# Patient Record
Sex: FEMALE | Race: ASIAN | Employment: UNEMPLOYED | URBAN - METROPOLITAN AREA
[De-identification: names, ages, dates, MRNs, and addresses within clinical notes are randomized per-mention and may not be internally consistent; named-entity substitution may affect disease eponyms.]

---

## 2019-05-15 ENCOUNTER — OFFICE VISIT (OUTPATIENT)
Dept: FAMILY MEDICINE | Facility: CLINIC | Age: 44
End: 2019-05-15
Payer: COMMERCIAL

## 2019-05-15 VITALS
TEMPERATURE: 98.9 F | HEIGHT: 61 IN | DIASTOLIC BLOOD PRESSURE: 74 MMHG | OXYGEN SATURATION: 100 % | SYSTOLIC BLOOD PRESSURE: 113 MMHG | BODY MASS INDEX: 24.73 KG/M2 | RESPIRATION RATE: 18 BRPM | HEART RATE: 72 BPM | WEIGHT: 131 LBS

## 2019-05-15 DIAGNOSIS — Z12.4 SCREENING FOR MALIGNANT NEOPLASM OF CERVIX: ICD-10-CM

## 2019-05-15 DIAGNOSIS — Z00.00 ROUTINE HISTORY AND PHYSICAL EXAMINATION OF ADULT: Primary | ICD-10-CM

## 2019-05-15 DIAGNOSIS — Z23 NEED FOR PROPHYLACTIC VACCINATION WITH TETANUS-DIPHTHERIA (TD): ICD-10-CM

## 2019-05-15 DIAGNOSIS — Z12.31 ENCOUNTER FOR SCREENING MAMMOGRAM FOR BREAST CANCER: ICD-10-CM

## 2019-05-15 PROCEDURE — 99386 PREV VISIT NEW AGE 40-64: CPT | Performed by: PREVENTIVE MEDICINE

## 2019-05-15 PROCEDURE — 87624 HPV HI-RISK TYP POOLED RSLT: CPT | Performed by: PREVENTIVE MEDICINE

## 2019-05-15 PROCEDURE — G0145 SCR C/V CYTO,THINLAYER,RESCR: HCPCS | Performed by: PREVENTIVE MEDICINE

## 2019-05-15 ASSESSMENT — MIFFLIN-ST. JEOR: SCORE: 1196.65

## 2019-05-15 ASSESSMENT — PAIN SCALES - GENERAL: PAINLEVEL: NO PAIN (0)

## 2019-05-15 NOTE — PATIENT INSTRUCTIONS
At [facility], we strive to deliver an exceptional experience to you, every time we see you.  If you receive a survey in the mail, please send us back your thoughts. We really do value your feedback.    Based on your medical history, these are the current health maintenance/preventive care services that you are due for (some may have been done at this visit.)  Health Maintenance Due   Topic Date Due     PREVENTIVE CARE VISIT  1976     HIV SCREEN (SYSTEM ASSIGNED)  08/03/1994     PAP SCREENING Q3 YR (SYSTEM ASSIGNED)  08/03/1997     DTAP/TDAP/TD IMMUNIZATION (1 - Tdap) 08/03/2001     PHQ-2  01/01/2019         Suggested websites for health information:  Www.Flexcom.org : Up to date and easily searchable information on multiple topics.  Www.medlineplus.gov : medication info, interactive tutorials, watch real surgeries online  Www.familydoctor.org : good info from the Academy of Family Physicians  Www.cdc.gov : public health info, travel advisories, epidemics (H1N1)  Www.aap.org : children's health info, normal development, vaccinations  Www.health.[facility].[place].us : [place] dept of health, public health issues in [place], N1N1    Your care team:                            Family Medicine Internal Medicine   MD Jamie Castillo MD Shantel Branch-Fleming, MD Katya Georgiev PA-C Nam Ho, MD Pediatrics   SUAD Mcneal, DUNIA Quesada APRMD Jacqui Garcia CNP, MD Deborah Mielke, MD Kim Thein, APRN CNP      Clinic hours: Monday - Thursday 7 am-7 pm; Fridays 7 am-5 pm.   Urgent care: Monday - Friday 11 am-9 pm; Saturday and Sunday 9 am-5 pm.  Pharmacy : Monday -Thursday 8 am-8 pm; Friday 8 am-6 pm; Saturday and Sunday 9 am-5 pm.     Clinic: [phone]   Pharmacy: [phone]    Preventive Health Recommendations  Female Ages 40 to 49    Yearly exam:     See your health care provider every year in order to  1. Review health changes.    2. Discuss preventive care.    3. Review your medicines if your doctor prescribed any.      Get a Pap test every three years (unless you have an abnormal result and your provider advises testing more often).      If you get Pap tests with HPV test, you only need to test every 5 years, unless you have an abnormal result. You do not need a Pap test if your uterus was removed (hysterectomy) and you have not had cancer.      You should be tested each year for STDs (sexually transmitted diseases), if you're at risk.     Ask your doctor if you should have a mammogram.      Have a colonoscopy (test for colon cancer) if someone in your family has had colon cancer or polyps before age 50.       Have a cholesterol test every 5 years.       Have a diabetes test (fasting glucose) after age 45. If you are at risk for diabetes, you should have this test every 3 years.    Shots: Get a flu shot each year. Get a tetanus shot every 10 years.     Nutrition:     Eat at least 5 servings of fruits and vegetables each day.    Eat whole-grain bread, whole-wheat pasta and brown rice instead of white grains and rice.    Get adequate Calcium and Vitamin D.      Lifestyle    Exercise at least 150 minutes a week (an average of 30 minutes a day, 5 days a week). This will help you control your weight and prevent disease.    Limit alcohol to one drink per day.    No smoking.     Wear sunscreen to prevent skin cancer.    See your dentist every six months for an exam and cleaning.  Preventive Health Recommendations  Female Ages 40 to 49    Yearly exam:   See your health care provider every year in order to  Review health changes.   Discuss preventive care.    Review your medicines if your doctor prescribed any.    Get a Pap test every three years (unless you have an abnormal result and your provider advises testing more often).    If you get Pap tests with HPV test, you only need to test every 5 years, unless you have an abnormal result. You do  not need a Pap test if your uterus was removed (hysterectomy) and you have not had cancer.    You should be tested each year for STDs (sexually transmitted diseases), if you're at risk.   Ask your doctor if you should have a mammogram.    Have a colonoscopy (test for colon cancer) if someone in your family has had colon cancer or polyps before age 50.     Have a cholesterol test every 5 years.     Have a diabetes test (fasting glucose) after age 45. If you are at risk for diabetes, you should have this test every 3 years.    Shots: Get a flu shot each year. Get a tetanus shot every 10 years.     Nutrition:   Eat at least 5 servings of fruits and vegetables each day.  Eat whole-grain bread, whole-wheat pasta and brown rice instead of white grains and rice.  Get adequate Calcium and Vitamin D.      Lifestyle  Exercise at least 150 minutes a week (an average of 30 minutes a day, 5 days a week). This will help you control your weight and prevent disease.  Limit alcohol to one drink per day.  No smoking.   Wear sunscreen to prevent skin cancer.  See your dentist every six months for an exam and cleaning.

## 2019-05-15 NOTE — LETTER
May 23, 2019    [facility]  [address] [place] [place] [address]    Dear ,  This letter is regarding your recent Pap smear (cervical cancer screening) and Human Papillomavirus (HPV) test.  We are happy to inform you that your Pap smear result is normal. Cervical cancer is closely linked with certain types of HPV. Your results showed no evidence of high-risk HPV.  Therefore we recommend you return in 5 years for your next pap smear and HPV test.  You will still need to return to the clinic every year for an annual exam and other preventive tests.  If you have additional questions regarding this result, please call our registered nurse, Ellen at [phone].  Sincerely,    Daja Cast MD/po

## 2019-05-15 NOTE — PROGRESS NOTES
SUBJECTIVE:   CC: Danae [facility] is an 43 year old woman who presents for preventive health visit.   Here with Maori interpretor    Healthy Habits:    Do you get at least three servings of calcium containing foods daily (dairy, green leafy vegetables, etc.)? yes    Amount of exercise or daily activities, outside of work: one hour and 4 times per week    Problems taking medications regularly No    Medication side effects: No    Have you had an eye exam in the past two years? yes    Do you see a dentist twice per year? yes    Do you have sleep apnea, excessive snoring or daytime drowsiness?no      Today's PHQ-2 Score:   PHQ-2 ( 1999 Pfizer) 5/15/2019   Q1: Little interest or pleasure in doing things 0   Q2: Feeling down, depressed or hopeless 0   PHQ-2 Score 0       Abuse: Current or Past(Physical, Sexual or Emotional)- No  Do you feel safe in your environment? Yes    Social History     Tobacco Use     Smoking status: Never Smoker     Smokeless tobacco: Never Used   Substance Use Topics     Alcohol use: Not on file     If you drink alcohol do you typically have >3 drinks per day or >7 drinks per week? No                     Reviewed orders with patient.  Reviewed health maintenance and updated orders accordingly - Yes  Lab work is in process  Labs reviewed in EPIC  BP Readings from Last 3 Encounters:   05/15/19 113/74    Wt Readings from Last 3 Encounters:   05/15/19 59.4 kg (131 lb)                  There is no problem list on file for this patient.    History reviewed. No pertinent surgical history.    Social History     Tobacco Use     Smoking status: Never Smoker     Smokeless tobacco: Never Used   Substance Use Topics     Alcohol use: Not on file     History reviewed. No pertinent family history.      No current outpatient medications on file.     No Known Allergies    Mammogram Screening: Patient under age 50, mutual decision reflected in health maintenance.      Pertinent mammograms are reviewed under the  "imaging tab.  History of abnormal Pap smear: NO - age 30-65 PAP every 5 years with negative HPV co-testing recommended     Reviewed and updated as needed this visit by clinical staff  Tobacco  Allergies  Meds  Fam Hx  Soc Hx        Reviewed and updated as needed this visit by Provider        History reviewed. No pertinent past medical history.   History reviewed. No pertinent surgical history.    ROS:  CONSTITUTIONAL: NEGATIVE for fever, chills, change in weight  INTEGUMENTARU/SKIN: NEGATIVE for worrisome rashes, moles or lesions  EYES: NEGATIVE for vision changes or irritation  ENT: NEGATIVE for ear, mouth and throat problems  RESP: NEGATIVE for significant cough or SOB  BREAST: NEGATIVE for masses, tenderness or discharge  CV: NEGATIVE for chest pain, palpitations or peripheral edema  GI: NEGATIVE for nausea, abdominal pain, heartburn, or change in bowel habits  : NEGATIVE for unusual urinary or vaginal symptoms. Periods are regular.  MUSCULOSKELETAL: NEGATIVE for significant arthralgias or myalgia  NEURO: NEGATIVE for weakness, dizziness or paresthesias  ENDOCRINE: NEGATIVE for temperature intolerance, skin/hair changes  HEME/ALLERGY/IMMUNE: NEGATIVE for bleeding problems  PSYCHIATRIC: NEGATIVE for changes in mood or affect    OBJECTIVE:   /74 (BP Location: Left arm, Patient Position: Sitting, Cuff Size: Adult Regular)   Pulse 72   Temp 98.9  F (37.2  C) (Oral)   Resp 18   Ht 1.558 m (5' 1.32\")   Wt 59.4 kg (131 lb)   LMP 05/07/2019 (Approximate)   SpO2 100%   BMI 24.50 kg/m    EXAM:  GENERAL: healthy, alert and no distress  EYES: Eyes grossly normal to inspection, PERRL and conjunctivae and sclerae normal  HENT: ear canals and TM's normal, nose and mouth without ulcers or lesions  NECK: no adenopathy, no asymmetry, masses, or scars and thyroid normal to palpation  RESP: lungs clear to auscultation - no rales, rhonchi or wheezes  BREAST: normal without masses, tenderness or nipple discharge " "and no palpable axillary masses or adenopathy  CV: regular rate and rhythm, normal S1 S2, no S3 or S4, no murmur, click or rub, no peripheral edema and peripheral pulses strong  ABDOMEN: soft, nontender, no hepatosplenomegaly, no masses and bowel sounds normal   (female): normal female external genitalia, normal urethral meatus, vaginal mucosa pink, moist, well rugated, and normal cervix/adnexa/uterus without masses or discharge  MS: no gross musculoskeletal defects noted, no edema  SKIN: no suspicious lesions or rashes  NEURO: Normal strength and tone, mentation intact and speech normal  PSYCH: mentation appears normal, affect normal/bright  LYMPH: no cervical, supraclavicular, axillary adenopathy    Diagnostic Test Results:  No results found for this or any previous visit (from the past 24 hour(s)).    ASSESSMENT/PLAN:   Danae was seen today for physical.    Diagnoses and all orders for this visit:    Routine history and physical examination of adult  -     Lipid panel reflex to direct LDL Fasting; Future  -     Glucose; Future  -will return for future fasting labs     Screening for malignant neoplasm of cervix  -     Pap imaged thin layer screen with HPV - recommended age 30 - 65 years (select HPV order below)  -     HPV High Risk Types DNA Cervical  -screening guidelines reviewed     Need for prophylactic vaccination with tetanus-diphtheria (Td)  -done in [place]     Encounter for screening mammogram for breast cancer  -     *MA Screening Digital Bilateral; Future, appointment scheduled         COUNSELING:   Reviewed preventive health counseling, as reflected in patient instructions       Regular exercise       Healthy diet/nutrition    Estimated body mass index is 24.5 kg/m  as calculated from the following:    Height as of this encounter: 1.558 m (5' 1.32\").    Weight as of this encounter: 59.4 kg (131 lb).         reports that she has never smoked. She has never used smokeless tobacco.      Counseling " Resources:  ATP IV Guidelines  Pooled Cohorts Equation Calculator  Breast Cancer Risk Calculator  FRAX Risk Assessment  ICSI Preventive Guidelines  Dietary Guidelines for Americans, 2010  USDA's MyPlate  ASA Prophylaxis  Lung CA Screening    Daja Cast MD MPH    [facility]

## 2019-05-17 LAB
COPATH REPORT: NORMAL
PAP: NORMAL

## 2019-05-18 DIAGNOSIS — Z00.00 ROUTINE HISTORY AND PHYSICAL EXAMINATION OF ADULT: ICD-10-CM

## 2019-05-18 PROCEDURE — 82947 ASSAY GLUCOSE BLOOD QUANT: CPT | Performed by: PREVENTIVE MEDICINE

## 2019-05-18 PROCEDURE — 36415 COLL VENOUS BLD VENIPUNCTURE: CPT | Performed by: PREVENTIVE MEDICINE

## 2019-05-18 PROCEDURE — 80061 LIPID PANEL: CPT | Performed by: PREVENTIVE MEDICINE

## 2019-05-18 NOTE — LETTER
May 21, 2019      Danae [facility]  [address] [place] [address]      Dear Danae [facility],     Cholesterol is at goal for you.   Glucose is normal, you do not have diabetes.   Please let me know if you have any questions and thank you for choosing [facility].     Regards,     Daja Cast MD MPH/smj    Resulted Orders   Glucose   Result Value Ref Range    Glucose 85 70 - 99 mg/dL      Comment:      Fasting specimen   Lipid panel reflex to direct LDL Fasting   Result Value Ref Range    Cholesterol 193 <200 mg/dL    Triglycerides 61 <150 mg/dL      Comment:      Fasting specimen    HDL Cholesterol 81 >49 mg/dL    LDL Cholesterol Calculated 100 (H) <100 mg/dL      Comment:      Desirable:       <100 mg/dl    Non HDL Cholesterol 112 <130 mg/dL

## 2019-05-20 LAB
CHOLEST SERPL-MCNC: 193 MG/DL
GLUCOSE SERPL-MCNC: 85 MG/DL (ref 70–99)
HDLC SERPL-MCNC: 81 MG/DL
LDLC SERPL CALC-MCNC: 100 MG/DL
NONHDLC SERPL-MCNC: 112 MG/DL
TRIGL SERPL-MCNC: 61 MG/DL

## 2019-05-21 LAB
FINAL DIAGNOSIS: NORMAL
HPV HR 12 DNA CVX QL NAA+PROBE: NEGATIVE
HPV16 DNA SPEC QL NAA+PROBE: NEGATIVE
HPV18 DNA SPEC QL NAA+PROBE: NEGATIVE
SPECIMEN DESCRIPTION: NORMAL
SPECIMEN SOURCE CVX/VAG CYTO: NORMAL

## 2019-05-21 NOTE — RESULT ENCOUNTER NOTE
Please send a letter:    Dear Danae [facility],    Cholesterol is at goal for you.  Glucose is normal, you do not have diabetes.  Please let me know if you have any questions and thank you for choosing [facility].    Regards,    Daja Cast MD MPH

## 2019-06-01 ENCOUNTER — ANCILLARY PROCEDURE (OUTPATIENT)
Dept: MAMMOGRAPHY | Facility: CLINIC | Age: 44
End: 2019-06-01
Attending: PREVENTIVE MEDICINE
Payer: COMMERCIAL

## 2019-06-01 DIAGNOSIS — Z12.31 ENCOUNTER FOR SCREENING MAMMOGRAM FOR BREAST CANCER: ICD-10-CM

## 2019-06-01 PROCEDURE — 77067 SCR MAMMO BI INCL CAD: CPT | Mod: TC

## 2019-06-07 ENCOUNTER — OFFICE VISIT (OUTPATIENT)
Dept: FAMILY MEDICINE | Facility: CLINIC | Age: 44
End: 2019-06-07
Payer: COMMERCIAL

## 2019-06-07 VITALS
HEART RATE: 72 BPM | HEIGHT: 61 IN | DIASTOLIC BLOOD PRESSURE: 74 MMHG | WEIGHT: 128 LBS | BODY MASS INDEX: 24.17 KG/M2 | TEMPERATURE: 97.8 F | OXYGEN SATURATION: 99 % | SYSTOLIC BLOOD PRESSURE: 108 MMHG

## 2019-06-07 DIAGNOSIS — Z30.09 ENCOUNTER FOR OTHER GENERAL COUNSELING OR ADVICE ON CONTRACEPTION: Primary | ICD-10-CM

## 2019-06-07 PROCEDURE — 99213 OFFICE O/P EST LOW 20 MIN: CPT | Performed by: PREVENTIVE MEDICINE

## 2019-06-07 ASSESSMENT — PAIN SCALES - GENERAL: PAINLEVEL: NO PAIN (0)

## 2019-06-07 ASSESSMENT — MIFFLIN-ST. JEOR: SCORE: 1177.98

## 2019-06-07 NOTE — PROGRESS NOTES
Subjective     Danae [facility] is a 43 year old female who presents to clinic today for the following health issues:    HPI     Birth control consult LMP 5/8/19   Here with Italian interpretor     Contraceptive methods were discussed in detail:    Barrier Methods: Condoms have to be used every time.  They do protect against sexually transmitted diseases.    Emergency Contraception: Use within 72 hours of unprotected intercourse for maximum effectiveness.    Hormonal contraceptive methods: This includes oral contraceptives pills, Nuva Ring, and patches. Most common side effects are nausea, bloating, headaches and mastalgia. These symptoms tend to decrease over time. Nausea can be reduced by taking pills at night. Contraindications to using estrogen containing hormonal contraception includes history of CAD or multiple risk factors (age over 55 years, smoking, high BP and diabetes). History of personal or family history of DVT or CVA. Current or past breast cancer, active liver disease or hepatic tumor.     Birth control pills are a medication you take every day to prevent pregnancy. If birth control pills are always used correctly, less than 1 out of 100 women using them will get pregnant each year. When you first start the pill, it takes several days to begin working. Be sure to use backup birth control (like a condom) for the first 7 days preferably till the first packet is completed.  The hormones in the pill keep your ovaries from releasing eggs and thicken your cervical mucus to block sperm from getting into the uterus.  Most women can get pregnant quickly when they stop using the pill.  Your periods may become lighter and less painful if you take the pill.  The hormones in pills offer health benefits. The pill can offer some protection against acne, non-cancerous breast growths, ectopic pregnancy, endometrial and ovarian cancers, iron deficiency anemia, and ovarian cysts.  Birth control pills do not protect against  sexually transmitted infections (STIs). Some women may have side effects while using birth control pills. They include bleeding between periods, breast tenderness, and nausea. Some of the most common side effects only last for the first few months.   Risks discussed including risk for heart attack, stroke and blood clots. Regular condom use is recommended to help protect against STIs.      Depo Provera:   Serious reactions include thromboembolism, bone density loss, anaphylaxis, and breast disease.  Common reactions include menstrual irregularities, acne, weight gain, decreased libido.      Continue consistent use of barrier protection to prevent sexually transmitted diseases.  Return for Depoprovera shot every 3 months,  failure to do so may cause unintended pregnancy.    Depo Provera may cause spotting in the beginning for the first 3 months.  Mostly in 3-6 months there is amennorhea. Long term use of over 2 years causes bone loss and should start Calcium at least 1200 mg a day and Vit D at least 800-1000 International Units a day, both over the counter.    Implanon: A small single plastic chula that is inserted by a trained professional in to the superficial SC tissue of the upper arm.  Effective for up to 3 years. Menstrual irregularities are common in the first 6-12 months.     Mirena IUD: releases low dose of Levonorgestrel. Approved for use up to 5 years. Safe and effective for use in adolescents and nulliparous women. Side effects include irregular menstrual spotting for the first 3-6 months that usually resolves after 6 months. Contraindications to use are malignancy , active uterine infection, and pregnancy.    There is no problem list on file for this patient.    History reviewed. No pertinent surgical history.    Social History     Tobacco Use     Smoking status: Never Smoker     Smokeless tobacco: Never Used   Substance Use Topics     Alcohol use: Not on file     History reviewed. No pertinent family  "history.      No current outpatient medications on file.     No Known Allergies  BP Readings from Last 3 Encounters:   06/07/19 108/74   05/15/19 113/74    Wt Readings from Last 3 Encounters:   06/07/19 58.1 kg (128 lb)   05/15/19 59.4 kg (131 lb)         Reviewed and updated as needed this visit by Provider  Tobacco  Allergies  Meds  Problems  Med Hx  Surg Hx  Fam Hx         Review of Systems   ROS COMP: Constitutional, HEENT, cardiovascular, pulmonary, gi and gu systems are negative, except as otherwise noted.      Objective    /74   Pulse 72   Temp 97.8  F (36.6  C) (Oral)   Ht 1.549 m (5' 1\")   Wt 58.1 kg (128 lb)   LMP 05/08/2019 (Exact Date)   SpO2 99%   Breastfeeding? No   BMI 24.19 kg/m    Body mass index is 24.19 kg/m .     Physical Exam   GENERAL APPEARANCE: healthy, alert and no distress  EYES: Eyes grossly normal to inspection and conjunctivae and sclerae normal  SKIN: no suspicious lesions or rashes  NEURO: Normal strength and tone, mentation intact and speech normal  PSYCH: mentation appears normal      Diagnostic Test Results:  Labs reviewed in Epic  No results found for this or any previous visit (from the past 24 hour(s)).        Assessment & Plan     Danae was seen today for contraception.    Diagnoses and all orders for this visit:    Encounter for other general counseling or advice on contraception  -Counseling done  -Patient decided to proceed with Nexplanon insertion    Complete discussion of Nexplanon done, including possible complications such as neural or vascular injury that may result in pain, paresthesia, bleeding, hematoma, scarring and infection was completed.  She will likely experience changes in menstrual bleeding pattern.      Patient has no contraindications to using nexplanon (ie pregnancy, current of past h/o thromboembolic disorder, liver tumor or active liver disease, undiagnosed abnormal genital bleeding, breast cancer, or allergic reaction to any of the " components of nexplanon).      Ok to schedule/proceed with insertion.     See Patient Instructions    Return in about 1 month (around 7/5/2019) for Routine Visit/Nexplanon placement.    Daja Cast MD MPH    [facility]

## 2019-06-07 NOTE — PATIENT INSTRUCTIONS
At [facility], we strive to deliver an exceptional experience to you, every time we see you.  If you receive a survey in the mail, please send us back your thoughts. We really do value your feedback.    Your care team:                            Family Medicine Internal Medicine   MD Jamie Castillo MD Shantel Branch-Fleming, MD Katya Georgiev PA-C Megan Hill, APRN DUNIA Mccann MD Pediatrics   Kaiden Kc, SUAD Bergeron, MD Kalpana Mckoen APRN CNP   MD Jacqui Nassar MD Deborah Mielke, MD Heidi Miguel, APRN [facility]      Clinic hours: Monday - Thursday 7 am-7 pm; Fridays 7 am-5 pm.   Urgent care: Monday - Friday 11 am-9 pm; Saturday and Sunday 9 am-5 pm.  Pharmacy : Monday -Thursday 8 am-8 pm; Friday 8 am-6 pm; Saturday and Sunday 9 am-5 pm.     Clinic: [phone]   Pharmacy: [phone]

## 2019-06-21 ENCOUNTER — OFFICE VISIT (OUTPATIENT)
Dept: FAMILY MEDICINE | Facility: CLINIC | Age: 44
End: 2019-06-21
Payer: COMMERCIAL

## 2019-06-21 VITALS
WEIGHT: 130 LBS | BODY MASS INDEX: 24.55 KG/M2 | OXYGEN SATURATION: 100 % | TEMPERATURE: 98.2 F | HEIGHT: 61 IN | SYSTOLIC BLOOD PRESSURE: 107 MMHG | DIASTOLIC BLOOD PRESSURE: 70 MMHG | HEART RATE: 69 BPM | RESPIRATION RATE: 18 BRPM

## 2019-06-21 DIAGNOSIS — Z30.017 NEXPLANON INSERTION: Primary | ICD-10-CM

## 2019-06-21 LAB — HCG UR QL: NEGATIVE

## 2019-06-21 PROCEDURE — 11981 INSERTION DRUG DLVR IMPLANT: CPT | Performed by: PREVENTIVE MEDICINE

## 2019-06-21 PROCEDURE — 81025 URINE PREGNANCY TEST: CPT | Performed by: PREVENTIVE MEDICINE

## 2019-06-21 PROCEDURE — 99207 ZZC DROP WITH A PROCEDURE: CPT | Performed by: PREVENTIVE MEDICINE

## 2019-06-21 ASSESSMENT — PAIN SCALES - GENERAL: PAINLEVEL: NO PAIN (0)

## 2019-06-21 ASSESSMENT — MIFFLIN-ST. JEOR: SCORE: 1187.06

## 2019-06-21 NOTE — PATIENT INSTRUCTIONS
Nexplanon Insertion Instructions.      You may remove the pressure bandage after 24 hours.  You may remove the small bandage in 3-5 days (allow the steri strips to fall off naturally in the shower).      Contact the clinic immediately if you develop any signs of infection such as warmth, redness, fever or discharge from the area.     You should use condoms to protect against unwanted pregnancy for 7 days after the date of insertion.

## 2019-06-21 NOTE — RESULT ENCOUNTER NOTE
Results discussed directly with patient while patient was present. Any further details documented in the note.   Daja Cast MD

## 2019-06-21 NOTE — PROGRESS NOTES
"Subjective     Danae Franklin is a 43 year old female who presents to clinic today for the following health issues:    HPI   Pt is here today for insertion of the Nexplanon Birth control.   Procedure Note - Etonogestrel Implant Insertion     HPI: Danae Franklin is here for Nexplanon (etonogestrel implant) insertion.   Indication: unwanted fertility  /70 (BP Location: Right arm, Patient Position: Sitting, Cuff Size: Adult Regular)   Pulse 69   Temp 98.2  F (36.8  C) (Oral)   Resp 18   Ht 1.549 m (5' 1\")   Wt 59 kg (130 lb)   LMP 06/20/2019   SpO2 100%   Breastfeeding? No   BMI 24.56 kg/m    Previous STD testing in the past 1 year? (if no, please send to lab for vaginal swab) No  Labs: UPT negative    Prev Contraception? none  Smoking?  No    Counselling and Consent:  Affirmation of informed consent was signed and scanned into the medical record. Risks, benefits and alternatives were discussed. Discussed potential side effects of the etonogestrel implant including the risk of irregular bleeding that may persist across the 3 yrs of use.  Instructed on use of condoms for STI prevention.  Patient's questions were elicited and answered.       Preoperative Diagnosis:  Unwanted fertility  Postoperative Diagnosis:  same     Technique:   Skin prep Betadine  Anesthesia 1% lidocaine, with epi  EBL:   minimal  Complications: No  Tolerance:  Pt tolerated procedure well and was in stable condition.     Pt was positioned on exam table with left arm flexed and externally rotated. Area was marked for insertion 8cm from the medial epicondyle avoiding the sulcus between the biceps and triceps. Anesthesia provided at the insertion site and along the insertion track and then the area was prepped with betadine. Etonogestrel implant was then inserted subdermally in usual fashion. Provider and patient confirmed placement by palpating the device. Pressure dressing applied and procedure complete.     Follow up:  Pt was instructed to call " if bleeding, severe pain or foul smell.  Instructed to remove pressure dressing after 24 hours, then may keep insertion site covered with a bandaid until it is healed.  Instructed that she requires removal or replacement of the device in 3 years.      Follow-up as needed.     Daja Cast MD MPH      CPT  Insertion: 61494  Removal/Insertion: 92463     J Code        There is no problem list on file for this patient.    History reviewed. No pertinent surgical history.    Social History     Tobacco Use     Smoking status: Never Smoker     Smokeless tobacco: Never Used   Substance Use Topics     Alcohol use: Not on file     History reviewed. No pertinent family history.      No current outpatient medications on file.     No Known Allergies  Reviewed and updated as needed this visit by Provider         Review of Systems   ROS COMP: Constitutional, HEENT, cardiovascular, pulmonary, gi and gu systems are negative, except as otherwise noted.      Objective    There were no vitals taken for this visit.  There is no height or weight on file to calculate BMI.  Physical Exam   GENERAL: healthy, alert and no distress  EYES: Eyes grossly normal to inspection, PERRL and conjunctivae and sclerae normal  RESP: lungs clear to auscultation - no rales, rhonchi or wheezes  CV: regular rate and rhythm, normal S1 S2, no S3 or S4, no murmur, click or rub, no peripheral edema and peripheral pulses strong  ABDOMEN: soft, nontender, no hepatosplenomegaly, no masses and bowel sounds normal  SKIN: no suspicious lesions or rashes  NEURO: Normal strength and tone, mentation intact and speech normal  PSYCH: mentation appears normal, affect normal/bright    Diagnostic Test Results:  Labs reviewed in Epic  Results for orders placed or performed in visit on 06/21/19 (from the past 24 hour(s))   HCG Qual, Urine ([id number])   Result Value Ref Range    HCG Qual Urine Negative NEG^Negative           Assessment & Plan     Danae was seen today for  procedure.    Diagnoses and all orders for this visit:    Nexplanon insertion  -     HCG Qual, Urine ([id number])  -     INSERTION NON-BIODEGRADABLE DRUG DELIVERY IMPLANT  -     ETONOGESTREL IMPLANT SYSTEM  -     etonogestrel (IMPLANON/NEXPLANON) subdermal implant 68 mg    Nexplanon Insertion Instructions.      You may remove the pressure bandage after 24 hours.  You may remove the small bandage in 3-5 days (allow the steri strips to fall off naturally in the shower).      Contact the clinic immediately if you develop any signs of infection such as warmth, redness, fever or discharge from the area.     You should use condoms to protect against unwanted pregnancy for 7 days after the date of insertion.            See Patient Instructions    Return in about 1 year (around 6/21/2020) for Routine Visit.    Daja Cast MD MPH    [facility]

## 2019-10-16 ENCOUNTER — OFFICE VISIT (OUTPATIENT)
Dept: FAMILY MEDICINE | Facility: CLINIC | Age: 44
End: 2019-10-16
Payer: COMMERCIAL

## 2019-10-16 VITALS
RESPIRATION RATE: 16 BRPM | HEART RATE: 78 BPM | OXYGEN SATURATION: 97 % | HEIGHT: 61 IN | WEIGHT: 133.6 LBS | SYSTOLIC BLOOD PRESSURE: 122 MMHG | BODY MASS INDEX: 25.22 KG/M2 | TEMPERATURE: 98.2 F | DIASTOLIC BLOOD PRESSURE: 73 MMHG

## 2019-10-16 DIAGNOSIS — Z13.29 SCREENING FOR THYROID DISORDER: Primary | ICD-10-CM

## 2019-10-16 DIAGNOSIS — Z28.21 INFLUENZA VACCINE REFUSED: ICD-10-CM

## 2019-10-16 LAB — TSH SERPL DL<=0.005 MIU/L-ACNC: 0.55 MU/L (ref 0.4–4)

## 2019-10-16 PROCEDURE — 84443 ASSAY THYROID STIM HORMONE: CPT | Performed by: PREVENTIVE MEDICINE

## 2019-10-16 PROCEDURE — 99213 OFFICE O/P EST LOW 20 MIN: CPT | Performed by: PREVENTIVE MEDICINE

## 2019-10-16 PROCEDURE — 36415 COLL VENOUS BLD VENIPUNCTURE: CPT | Performed by: PREVENTIVE MEDICINE

## 2019-10-16 ASSESSMENT — MIFFLIN-ST. JEOR: SCORE: 1198.39

## 2019-10-16 ASSESSMENT — PAIN SCALES - GENERAL: PAINLEVEL: NO PAIN (0)

## 2019-10-16 NOTE — PATIENT INSTRUCTIONS
At [facility], we strive to deliver an exceptional experience to you, every time we see you.  If you receive a survey in the mail, please send us back your thoughts. We really do value your feedback.    Based on your medical history, these are the current health maintenance/preventive care services that you are due for (some may have been done at this visit.)  Health Maintenance Due   Topic Date Due     HIV SCREENING  08/03/1991     DTAP/TDAP/TD IMMUNIZATION (1 - Tdap) 08/03/2001     INFLUENZA VACCINE (1) 09/01/2019         Suggested websites for health information:  Www.[employer] Vaccines : Up to date and easily searchable information on multiple topics.  Www.medlineplus.gov : medication info, interactive tutorials, watch real surgeries online  Www.familydoctor.org : good info from the Academy of Family Physicians  Www.cdc.gov : public health info, travel advisories, epidemics (H1N1)  Www.aap.org : children's health info, normal development, vaccinations  Www.health.[facility].[place].us : [place] dept of health, public health issues in [place], N1N1    Your care team:                            Family Medicine Internal Medicine   MD Jamie Castillo MD Shantel Branch-Fleming, MD Katya Georgiev PA-C Nam Ho, MD Pediatrics   SUAD Mcneal, MD Jacqui Orozco CNP, MD Deborah Mielke, MD Kim Thein, APRN [facility]      Clinic hours: Monday - Thursday 7 am-7 pm; Fridays 7 am-5 pm.   Urgent care: Monday - Friday 11 am-9 pm; Saturday and Sunday 9 am-5 pm.  Pharmacy : Monday -Thursday 8 am-8 pm; Friday 8 am-6 pm; Saturday and Sunday 9 am-5 pm.     Clinic: [phone]   Pharmacy: [phone]

## 2019-10-16 NOTE — PROGRESS NOTES
"Subjective     Danae Franklin is a 44 year old female who presents to clinic today for the following health issues:    HPI   Patient presents today wanting to have her thyroid levels checked.   Here with interpretor  Father with thyroid disease  Would like thyroid function checked  Was checked in [place] 3 years ago, was normal, has not been on medication for thyroid issues in the past   No symptoms, no bowel changes, no skin changes, no heat or cold intolerance   Has Nexplanon and has had irregular periods, discussed that normal to have irregular menses on Nexplanon       There is no problem list on file for this patient.    History reviewed. No pertinent surgical history.    Social History     Tobacco Use     Smoking status: Never Smoker     Smokeless tobacco: Never Used   Substance Use Topics     Alcohol use: Not Currently     Family History   Family history unknown: Yes         No current outpatient medications on file.     No Known Allergies  BP Readings from Last 3 Encounters:   10/16/19 122/73   06/21/19 107/70   06/07/19 108/74    Wt Readings from Last 3 Encounters:   10/16/19 60.6 kg (133 lb 9.6 oz)   06/21/19 59 kg (130 lb)   06/07/19 58.1 kg (128 lb)              Reviewed and updated as needed this visit by Provider  Tobacco  Allergies  Meds  Problems  Med Hx  Surg Hx  Fam Hx         Review of Systems   ROS COMP: Constitutional, HEENT, cardiovascular, pulmonary, gi and gu systems are negative, except as otherwise noted.      Objective    /73 (BP Location: Left arm, Patient Position: Sitting, Cuff Size: Adult Large)   Pulse 78   Temp 98.2  F (36.8  C) (Oral)   Resp 16   Ht 1.549 m (5' 1\")   Wt 60.6 kg (133 lb 9.6 oz)   LMP  (LMP Unknown)   SpO2 97%   Breastfeeding? No   BMI 25.24 kg/m    Body mass index is 25.24 kg/m .  Physical Exam   GENERAL APPEARANCE: healthy, alert and no distress  EYES: Eyes grossly normal to inspection and conjunctivae and sclerae normal  NECK: no adenopathy and " "trachea midline and normal to palpation, no thyromegaly   RESP: lungs clear to auscultation - no rales, rhonchi or wheezes  CV: regular rates and rhythm, normal S1 S2, no S3 or S4 and no murmur, click or rub  ABDOMEN: soft, non-tender and no rebound or guarding   MS: extremities normal- no gross deformities noted and peripheral pulses normal  SKIN: no suspicious lesions or rashes  NEURO: Normal strength and tone, mentation intact and speech normal  PSYCH: mentation appears normal      Diagnostic Test Results:  Labs reviewed in Epic  No results found for this or any previous visit (from the past 24 hour(s)).        Assessment & Plan     Danae was seen today for thyroid problem.    Diagnoses and all orders for this visit:    Screening for thyroid disorder  -     TSH with free T4 reflex  -await results     Influenza vaccine refused         BMI:   Estimated body mass index is 25.24 kg/m  as calculated from the following:    Height as of this encounter: 1.549 m (5' 1\").    Weight as of this encounter: 60.6 kg (133 lb 9.6 oz).         Return in about 1 year (around 10/16/2020) for Routine Visit.    Daja Cast MD MPH    [facility]      "

## 2019-10-16 NOTE — LETTER
[address]  [address]  [phone]    October 17, 2019      Danae Franklin  [address]  [place] [address]        Dear Danae Franklin,     Thyroid function labs are normal.   Please let me know if you have any questions and thank you for choosing [facility].     Regards,     Daja Cast MD MPH

## 2019-10-17 NOTE — RESULT ENCOUNTER NOTE
Please send a letter:    Dear Danae Franklin,    Thyroid function labs are normal.  Please let me know if you have any questions and thank you for choosing [facility].    Regards,    Daja Cast MD MPH

## 2020-03-11 ENCOUNTER — HEALTH MAINTENANCE LETTER (OUTPATIENT)
Age: 45
End: 2020-03-11

## 2021-01-03 ENCOUNTER — HEALTH MAINTENANCE LETTER (OUTPATIENT)
Age: 46
End: 2021-01-03

## 2021-08-15 ENCOUNTER — HEALTH MAINTENANCE LETTER (OUTPATIENT)
Age: 46
End: 2021-08-15

## 2021-10-10 ENCOUNTER — HEALTH MAINTENANCE LETTER (OUTPATIENT)
Age: 46
End: 2021-10-10

## 2022-01-30 ENCOUNTER — HEALTH MAINTENANCE LETTER (OUTPATIENT)
Age: 47
End: 2022-01-30

## 2022-09-18 ENCOUNTER — HEALTH MAINTENANCE LETTER (OUTPATIENT)
Age: 47
End: 2022-09-18

## 2023-05-07 ENCOUNTER — HEALTH MAINTENANCE LETTER (OUTPATIENT)
Age: 48
End: 2023-05-07

## 2023-10-08 ENCOUNTER — HEALTH MAINTENANCE LETTER (OUTPATIENT)
Age: 48
End: 2023-10-08